# Patient Record
Sex: FEMALE | Race: WHITE | NOT HISPANIC OR LATINO | Employment: UNEMPLOYED | URBAN - METROPOLITAN AREA
[De-identification: names, ages, dates, MRNs, and addresses within clinical notes are randomized per-mention and may not be internally consistent; named-entity substitution may affect disease eponyms.]

---

## 2017-01-29 ENCOUNTER — HOSPITAL ENCOUNTER (OUTPATIENT)
Dept: RADIOLOGY | Facility: MEDICAL CENTER | Age: 22
Discharge: HOME | End: 2017-01-29
Attending: PHYSICIAN ASSISTANT | Admitting: FAMILY MEDICINE
Payer: COMMERCIAL

## 2017-01-29 ENCOUNTER — OFFICE VISIT (OUTPATIENT)
Dept: URGENT CARE | Facility: MEDICAL CENTER | Age: 22
End: 2017-01-29
Payer: COMMERCIAL

## 2017-01-29 DIAGNOSIS — S59.909A INJURY OF ELBOW: ICD-10-CM

## 2017-01-29 PROCEDURE — 99202 OFFICE O/P NEW SF 15 MIN: CPT

## 2017-01-29 PROCEDURE — 73080 X-RAY EXAM OF ELBOW: CPT

## 2018-08-30 ENCOUNTER — OFFICE VISIT (OUTPATIENT)
Dept: URGENT CARE | Facility: MEDICAL CENTER | Age: 23
End: 2018-08-30
Payer: COMMERCIAL

## 2018-08-30 VITALS
DIASTOLIC BLOOD PRESSURE: 84 MMHG | BODY MASS INDEX: 28.93 KG/M2 | TEMPERATURE: 98.2 F | HEIGHT: 66 IN | SYSTOLIC BLOOD PRESSURE: 124 MMHG | HEART RATE: 98 BPM | WEIGHT: 180 LBS | OXYGEN SATURATION: 98 % | RESPIRATION RATE: 18 BRPM

## 2018-08-30 DIAGNOSIS — T22.212A PARTIAL THICKNESS BURN OF LEFT FOREARM, INITIAL ENCOUNTER: Primary | ICD-10-CM

## 2018-08-30 PROCEDURE — 99213 OFFICE O/P EST LOW 20 MIN: CPT | Performed by: FAMILY MEDICINE

## 2018-08-30 RX ORDER — SULFAMETHOXAZOLE AND TRIMETHOPRIM 800; 160 MG/1; MG/1
1 TABLET ORAL EVERY 12 HOURS SCHEDULED
Qty: 20 TABLET | Refills: 0 | Status: SHIPPED | OUTPATIENT
Start: 2018-08-30 | End: 2018-09-09

## 2018-08-30 RX ORDER — DULOXETIN HYDROCHLORIDE 60 MG/1
CAPSULE, DELAYED RELEASE ORAL
COMMUNITY
Start: 2018-04-27

## 2018-08-30 RX ORDER — NORGESTIMATE AND ETHINYL ESTRADIOL 0.25-0.035
1 KIT ORAL
COMMUNITY
Start: 2018-04-30

## 2018-08-30 NOTE — PROGRESS NOTES
[facility] Now        NAME: Sidney Simmonds is a 23 y o  female  : 1995    MRN: [id number]  DATE: 2018  TIME: 6:13 PM    Assessment and Plan   Partial thickness burn of left forearm, initial encounter [T22 212A]  1  Partial thickness burn of left forearm, initial encounter  sulfamethoxazole-trimethoprim (BACTRIM DS) 800-160 mg per tablet         Patient Instructions       Follow up with PCP in 3-5 days  Proceed to  ER if symptoms worsen  Chief Complaint     Chief Complaint   Patient presents with    Burn     ~ [id number] to left forearm on Saturday with  lightbulb; today periphery became swollen and sore         History of Present Illness       23year-old here today because she sustained a burn on her left forearm 5 days ago  Patient mistakenly rubbed her left forearm against a light pole  She had pain immediately  She applied ice  However it is blistered and she has been apply Silvadene cream   However since yesterday, she has noticed redness and swelling of her left forearm and pain  Describes some drainage  Denies any fever or chills  Review of Systems   Review of Systems   Constitutional: Negative  Skin: Positive for wound           Current Medications       Current Outpatient Prescriptions:     DULoxetine (CYMBALTA) 60 mg delayed release capsule, TAKE ONE CAPSULE BY MOUTH EVERY DAY, Disp: , Rfl:     norgestimate-ethinyl estradiol (ORTHO-CYCLEN) 0 25-35 MG-MCG per tablet, Take 1 tablet by mouth, Disp: , Rfl:     sulfamethoxazole-trimethoprim (BACTRIM DS) 800-160 mg per tablet, Take 1 tablet by mouth every 12 (twelve) hours for 10 days, Disp: 20 tablet, Rfl: 0    Current Allergies     Allergies as of 2018    (No Known Allergies)            The following portions of the patient's history were reviewed and updated as appropriate: allergies, current medications, past family history, past medical history, past social history, past surgical history and problem list  No past medical history on file  No past surgical history on file  No family history on file  Medications have been verified  Objective   /84   Pulse 98   Temp 98 2 °F (36 8 °C)   Resp 18   Ht 5' 6" (1 676 m)   Wt 81 6 kg (180 lb)   SpO2 98%   BMI 29 05 kg/m²        Physical Exam     Physical Exam   Skin:   Left forearm reveals a wound measuring approximately 3 x 2 cm in circumference  Findings consistent with second-degree burn  There is also an area of erythema around the wound measuring 6-8 cm  Findings consistent with cellulitis  Nursing note and vitals reviewed

## 2018-08-30 NOTE — PATIENT INSTRUCTIONS
I apply Silvadene cream to the burn followed by nonadherent dressing  Patient started empirically on Bactrim DS 1 tablet twice a day for 10 days  Advised patient to keep left arm elevated, take Tylenol ibuprofen as needed for pain  Follow-up per primary care provider if wound seems infected  Acute Wound Care   AMBULATORY CARE:   An acute wound  is an injury that causes a break in the skin  An acute wound can happen suddenly, last a short time, and may heal on its own  Common signs and symptoms of an acute wound:   · A cut, tear, or gash in your skin    · Bleeding, swelling, pain, or trouble moving the affected area    · Dirt or foreign objects inside the wound     · Milky, yellow, green, or brown pus in the wound     · Red, tender, or warm area around the pus    · Fever  Seek care immediately if:   · You have pus or a foul odor coming from the wound  · You have sudden trouble breathing or chest pain  · Blood soaks through your bandage  Contact your healthcare provider if:   · You have muscle, joint, or body aches, sweating, or a fever  · You have more swelling, redness, or bleeding in your wound  · Your skin is itchy, swollen, or you have a rash  · You have questions or concerns about your condition or care  Treatment for an acute wound  may include any of the following:  · Cleansing  is done with soap and water to wash away germs and decrease the risk of infection  Sterile water further cleans the wound  The cleaning is done under high pressure with a catheter tip and large syringe  A solution that kills germs may also be used  · Debridement  is done to clean and remove objects, dirt, or dead tissues from the open wound  Healthcare providers may also drain the wound to clean out pus  · Closure of the wound  is done with stitches, staples, skin adhesive, or other treatments  This may be done if the wound is wide or deep   Stitches may be needed if the wound is in an area that moves a lot, such as the hands, feet, and joints  Stitches may help to keep the wound from getting infected  They may also decrease the amount of scarring you have  Some wounds may heal better without stitches  Wound care:   · If your wound was closed with thin strips of medical tape, keep them clean and dry  The strips of medical tape will fall off on their own  Do not pull them off  · Keep the bandage clean and dry  Do not remove the bandage over your wound unless your healthcare provider says it is okay  · Wash your hands before and after you take care of your wound to prevent infection  · Clean the wound as directed  If you cannot reach the wound, have someone help you  · If you have packing, make sure all the gauze used to pack the wound is taken out and replaced as directed  Keep track of how many gauze dressings are placed inside the wound  Follow up with your healthcare provider as directed:  Write down your questions so you remember to ask them during your visits  © 2016 [employer] is for End User's use only and may not be sold, redistributed or otherwise used for commercial purposes  All illustrations and images included in CareNotes® are the copyrighted property of A D A M , [employer]  or [employer]  The above information is an  only  It is not intended as medical advice for individual conditions or treatments  Talk to your doctor, nurse or pharmacist before following any medical regimen to see if it is safe and effective for you

## 2020-09-01 ENCOUNTER — APPOINTMENT (OUTPATIENT)
Dept: RADIOLOGY | Facility: MEDICAL CENTER | Age: 25
End: 2020-09-01
Payer: COMMERCIAL

## 2020-09-01 ENCOUNTER — OFFICE VISIT (OUTPATIENT)
Dept: URGENT CARE | Facility: MEDICAL CENTER | Age: 25
End: 2020-09-01
Payer: COMMERCIAL

## 2020-09-01 ENCOUNTER — OFFICE VISIT (OUTPATIENT)
Dept: OBGYN CLINIC | Facility: MEDICAL CENTER | Age: 25
End: 2020-09-01
Payer: COMMERCIAL

## 2020-09-01 VITALS
BODY MASS INDEX: 36.8 KG/M2 | SYSTOLIC BLOOD PRESSURE: 115 MMHG | WEIGHT: 229 LBS | HEIGHT: 66 IN | TEMPERATURE: 98.1 F | HEART RATE: 76 BPM | DIASTOLIC BLOOD PRESSURE: 72 MMHG

## 2020-09-01 VITALS
HEART RATE: 85 BPM | DIASTOLIC BLOOD PRESSURE: 69 MMHG | BODY MASS INDEX: 37.11 KG/M2 | OXYGEN SATURATION: 99 % | TEMPERATURE: 98.9 F | WEIGHT: 229.94 LBS | SYSTOLIC BLOOD PRESSURE: 120 MMHG | RESPIRATION RATE: 20 BRPM

## 2020-09-01 DIAGNOSIS — S92.902A CLOSED FRACTURE OF LEFT FOOT, INITIAL ENCOUNTER: ICD-10-CM

## 2020-09-01 DIAGNOSIS — S92.902A CLOSED FRACTURE OF LEFT FOOT, INITIAL ENCOUNTER: Primary | ICD-10-CM

## 2020-09-01 DIAGNOSIS — S99.912A ANKLE INJURY, LEFT, INITIAL ENCOUNTER: ICD-10-CM

## 2020-09-01 PROCEDURE — 29515 APPLICATION SHORT LEG SPLINT: CPT | Performed by: PHYSICIAN ASSISTANT

## 2020-09-01 PROCEDURE — 73630 X-RAY EXAM OF FOOT: CPT

## 2020-09-01 PROCEDURE — 29515 APPLICATION SHORT LEG SPLINT: CPT | Performed by: ORTHOPAEDIC SURGERY

## 2020-09-01 PROCEDURE — 73610 X-RAY EXAM OF ANKLE: CPT

## 2020-09-01 PROCEDURE — 99203 OFFICE O/P NEW LOW 30 MIN: CPT | Performed by: ORTHOPAEDIC SURGERY

## 2020-09-01 PROCEDURE — 99213 OFFICE O/P EST LOW 20 MIN: CPT | Performed by: PHYSICIAN ASSISTANT

## 2020-09-01 NOTE — PATIENT INSTRUCTIONS
Foot Fracture in Adults   WHAT YOU NEED TO KNOW:   A foot fracture is a break in one or more of the bones in your foot  Foot fractures are commonly caused by trauma, falls, or repeated stress injuries  DISCHARGE INSTRUCTIONS:   Medicines:   · Antibiotics: This medicine is given to help treat or prevent an infection caused by bacteria  · NSAIDs:  These medicines decrease swelling and pain  NSAIDs are available without a doctor's order  Ask which medicine is right for you  Ask how much to take and when to take it  Take as directed  NSAIDs can cause stomach bleeding and kidney problems if not taken correctly  · Pain medicine: You may be given a prescription medicine to decrease pain  Do not wait until the pain is severe before you take this medicine  · Take your medicine as directed  Contact your healthcare provider if you think your medicine is not helping or if you have side effects  Tell him of her if you are allergic to any medicine  Keep a list of the medicines, vitamins, and herbs you take  Include the amounts, and when and why you take them  Bring the list or the pill bottles to follow-up visits  Carry your medicine list with you in case of an emergency  Follow up with your healthcare provider or bone specialist as directed: You may need to return to have your splint or stitches removed  You may also need to return for tests to make sure your foot is healing  Write down your questions so you remember to ask them during your visits  Wound care:  Carefully wash the wound with soap and water  Dry the area and put on new, clean bandages as directed  Change your bandages when they get wet or dirty  Self-care:   · Rest:  You may need to rest your foot and avoid activities that cause pain  For stress fractures, you will need to avoid the activity that caused the fracture until it heals  Ask when you can return to your normal activities such as work and sports      · Ice:  Ice helps decrease swelling and pain  Ice may also help prevent tissue damage  Use an ice pack or put crushed ice in a plastic bag  Cover it with a towel, and place it on your foot for 15 to 20 minutes every hour as directed  · Elevate your foot:  Raise your foot at or above the level of your heart as often as you can  This will help decrease swelling and pain  Prop your foot on pillows or blankets to keep it elevated comfortably  · Physical therapy: Once your foot has healed, a physical therapist can teach you exercises to help improve movement and strength, and to decrease pain  Splint care:   · Check the skin around your splint daily for any redness or open areas  · Do not use a sharp or pointed object to scratch your skin under the splint  · Do not remove your splint unless your healthcare provider or orthopedic surgeon says it is okay  Bathing with a splint:  Do not let your splint get wet  Before bathing, cover the splint with a plastic bag  Tape the bag to your skin above the splint to seal out the water  Keep your foot out of the water in case the bag leaks  Ask when it is okay to take a bath or shower  Assistive devices: You may be given a hard-soled shoe to wear while your foot is healing  You also may need to use crutches to help you walk while your foot heals  It is important to use your crutches correctly  Ask for more information about how to use crutches  Contact your healthcare provider or bone specialist if:   · You have a fever  · You have new sores around your boot or splint  · You have new or worsening trouble moving your foot  · You notice a foul smell coming from under your splint  · Your boot or splint gets damaged  · You have questions or concerns about your condition or care  Return to the emergency department if:   · The pain in your injured foot gets worse even after you rest and take pain medicine      · The skin or toes of your foot become numb, swollen, cold, white, or blue     · You have more pain or swelling than you did before the splint was put on  · Your wound is draining fluid or pus  · Blood soaks through your bandage  · Your leg feels warm, tender, and painful  It may look swollen and red  · You suddenly feel lightheaded and short of breath  · You have chest pain when you take a deep breath or cough  You may cough up blood  © 2017 [employer]  Information is for End User's use only and may not be sold, redistributed or otherwise used for commercial purposes  All illustrations and images included in CareNotes® are the copyrighted property of A D [employer] M , Inc  or [employer]  The above information is an  only  It is not intended as medical advice for individual conditions or treatments  Talk to your doctor, nurse or pharmacist before following any medical regimen to see if it is safe and effective for you

## 2020-09-01 NOTE — PROGRESS NOTES
[facility]        NAME: Carmelita Nguyen is a 27 y o  female  : 1995    MRN: [id number]  DATE: 2020  TIME: 10:15 AM    Assessment and Plan   Closed fracture of left foot, initial encounter [F50 125N]  1  Closed fracture of left foot, initial encounter  Ambulatory referral to Orthopedic Surgery   2  Ankle injury, left, initial encounter  XR ankle 3+ vw left         Patient Instructions     X-ray in office shows probable foot fracture  Provider read  Pending radiology review  Splinted in office  Referral to orthopedics was placed  Advised to follow-up with ortho  RICE  Ibuprofen or tylenol as needed for pain  Follow up with PCP in 3-5 days  Proceed to  ER if symptoms worsen  Chief Complaint     Chief Complaint   Patient presents with    Ankle Pain     Patient states she twisted her L ankle last night in the yard while moving  Pain is in the medial aspect of her ankle on the L         History of Present Illness       Ankle Pain    The incident occurred 12 to 24 hours ago (11:30 last night)  The incident occurred at home  The injury mechanism was an eversion injury (patient states she stepped in 6 inch hole and states it smacked the inner part of her ankle and foot)  The pain is present in the left foot and left ankle  The quality of the pain is described as stabbing  Pain scale: 10/10 with walking, worse with walking down stairs  The pain is moderate  Associated symptoms include an inability to bear weight, numbness and tingling  Pertinent negatives include no loss of motion or loss of sensation  She reports no foreign bodies present  The symptoms are aggravated by weight bearing  She has tried nothing for the symptoms  Review of Systems   Review of Systems   Musculoskeletal: Positive for gait problem (antalgic gait secondary to left foot injury)  Skin: Positive for color change (positive for bruising along medial aspect of left foot)  Negative for pallor, rash and wound  Neurological: Positive for tingling and numbness  Current Medications       Current Outpatient Medications:     norgestimate-ethinyl estradiol (ORTHO-CYCLEN) 0 25-35 MG-MCG per tablet, Take 1 tablet by mouth, Disp: , Rfl:     DULoxetine (CYMBALTA) 60 mg delayed release capsule, TAKE ONE CAPSULE BY MOUTH EVERY DAY, Disp: , Rfl:     Current Allergies     Allergies as of 09/01/2020 - Reviewed 08/30/2018   Allergen Reaction Noted    Pollen extract  04/18/2016            The following portions of the patient's history were reviewed and updated as appropriate: allergies, current medications, past family history, past medical history, past social history, past surgical history and problem list      History reviewed  No pertinent past medical history  History reviewed  No pertinent surgical history  History reviewed  No pertinent family history  Medications have been verified  Objective   /69   Pulse 85   Temp 98 9 °F (37 2 °C)   Resp 20   Wt 104 kg (229 lb 15 oz)   LMP 08/11/2020   SpO2 99%   BMI 37 11 kg/m²          Physical Exam     Physical Exam  Vitals signs and nursing note reviewed  Constitutional:       General: She is not in acute distress  Appearance: Normal appearance  She is not ill-appearing or toxic-appearing  Cardiovascular:      Rate and Rhythm: Normal rate and regular rhythm  Pulses: Normal pulses  Heart sounds: Normal heart sounds  No murmur  No friction rub  No gallop  Pulmonary:      Effort: Pulmonary effort is normal  No respiratory distress  Breath sounds: Normal breath sounds  No stridor  No wheezing or rhonchi  Musculoskeletal:      Left ankle: She exhibits decreased range of motion and swelling  She exhibits no ecchymosis  Tenderness  Medial malleolus tenderness found  No lateral malleolus tenderness found  Achilles tendon exhibits no pain, no defect and normal Mcallister's test results  Left foot: Decreased range of motion  Tenderness and bony tenderness present  No swelling  Feet:    Neurological:      General: No focal deficit present  Mental Status: She is alert and oriented to person, place, and time  Psychiatric:         Mood and Affect: Mood normal          Behavior: Behavior normal        Splint application    Date/Time: 9/1/2020 10:45 AM  Performed by: Khushi Rodriguez PA-C  Authorized by: Khushi Rodriguez PA-C     Consent:     Consent obtained:  Verbal    Consent given by:  Patient    Risks discussed:  Discoloration, numbness, pain and swelling  Pre-procedure details:     Sensation:  Normal  Procedure details:     Laterality:  Left    Location:  Foot    Foot:  L foot    Splint type:  Short leg    Supplies:  Ortho-Glass and cotton padding  Post-procedure details:     Pain:  Improved    Sensation:  Normal    Patient tolerance of procedure:   Tolerated well, no immediate complications

## 2020-09-01 NOTE — PROGRESS NOTES
Orthopaedic Surgery - Office Note  Jose Pinon (89 y o  female)   : 1995   MRN: [id number]  Encounter Date: 2020    Chief Complaint   Patient presents with    Left Foot - Pain       Assessment / Plan  Midfoot injury, concern for navicular fracture vs TMT sprain   DOI: 2020    · CT ordered to better assess the navicular   · New splint was placed today  · NWB LLE  · Declined work note  Return for after CT  History of Present Illness  Joes Pinon is a 27 y o  female who presents for initial evaluation of her left foot injury sustained yesterday 2020  She was seen earlier today in urgent care and was referred to Orthopedics  She states that she is in the process of moving and was unloading her truck last night when she stepped in a hole and inverted her left ankle  She had immediate pain to medial aspect of the foot  She was able to weightbear but with significant pain  She presented to urgent care where x-rays were performed and was told there is concern for fracture her foot  She was splinted and placed on bilateral crutches  She presents today with pain continuing in the medial aspect of the foot  She denies any lateral foot or ankle pain  She denies any previous history of significant injury to this foot  Review of Systems  Pertinent items are noted in HPI  All other systems were reviewed and are negative  Physical Exam  /72   Pulse 76   Temp 98 1 °F (36 7 °C)   Ht 5' 6" (1 676 m)   Wt 104 kg (229 lb)   LMP 2020   BMI 36 96 kg/m²   Cons: Appears well  No apparent distress  Psych: Alert  Oriented x3  Mood and affect normal   Eyes: PERRLA, EOMI  Resp: Normal effort  No audible wheezing or stridor  CV: Palpable pulse  No discernable arrhythmia  No LE edema  Lymph:  No palpable cervical, axillary, or inguinal lymphadenopathy  Skin: Warm  No palpable masses  No visible lesions  Neuro: Normal muscle tone  Normal and symmetric DTR's       Left Foot & Ankle Exam  Alignment:  Normal ankle alignment  Inspection:  No swelling  No ecchymosis  Palpation:  Navicular and instep tenderness  ROM:  Normal ankle ROM  with pain, specifically inversion  Strength:  Not tested  Stability:  No objective ankle instablity  (-) Anterior  neutral and PF  (-) Talar Tilt  Tests:  No pertinent positive or negative tests  Neurovascular:  Sensation intact in DP/SP/Hernández/Sa/T nerve distributions  2+ DP & PT pulses  Brisk capillary refill in all toes  Toes warm and perfused  Gait:  Not tested  Studies Reviewed  I have personally reviewed pertinent films in PACS and my interpretation is X-ray left foot 09/01/2020: There is apparent irregularity in the navicular, concern for fracture  Splint application    Date/Time: 9/1/2020 3:35 PM  Performed by: Jaime Garcia MD  Authorized by: Jaime Garcia MD     Consent:     Consent obtained:  Verbal    Consent given by:  Patient  Pre-procedure details:     Sensation:  Normal  Procedure details:     Laterality:  Left    Location:  Foot    Foot:  L foot    Splint type:  Short leg    Supplies:  Cotton padding, fiberglass and elastic bandage  Post-procedure details:     Pain:  Unchanged    Sensation:  Normal    Patient tolerance of procedure: Tolerated well, no immediate complications           Medical, Surgical, Family, and Social History  The patient's medical history, family history, and social history, were reviewed and updated as appropriate  History reviewed  No pertinent past medical history  History reviewed  No pertinent surgical history  History reviewed  No pertinent family history      Social History     Occupational History    Not on file   Tobacco Use    Smoking status: Never Smoker    Smokeless tobacco: Never Used   Substance and Sexual Activity    Alcohol use: Not Currently    Drug use: Never    Sexual activity: Not on file       Allergies   Allergen Reactions    Pollen Extract Current Outpatient Medications:     DULoxetine (CYMBALTA) 60 mg delayed release capsule, TAKE ONE CAPSULE BY MOUTH EVERY DAY, Disp: , Rfl:     norgestimate-ethinyl estradiol (ORTHO-CYCLEN) 0 25-35 MG-MCG per tablet, Take 1 tablet by mouth, Disp: , Rfl:       Edwina Tracey MA    Scribe Attestation    I,:   Edwina Tracey MA am acting as a scribe while in the presence of the attending physician :        I,:   Christine Khanna MD personally performed the services described in this documentation    as scribed in my presence :

## 2020-09-02 ENCOUNTER — TELEPHONE (OUTPATIENT)
Dept: OBGYN CLINIC | Facility: HOSPITAL | Age: 25
End: 2020-09-02

## 2020-09-02 NOTE — TELEPHONE ENCOUNTER
Patient has moved up CT scan to 9/4 and would like call to confirm new date has been authorized before going   [phone]

## 2020-09-03 ENCOUNTER — TELEPHONE (OUTPATIENT)
Dept: OBGYN CLINIC | Facility: HOSPITAL | Age: 25
End: 2020-09-03

## 2020-09-03 NOTE — TELEPHONE ENCOUNTER
Patient sees Dr Rosendo Perrin    Patient called in stating that her CT was cancelled and wanted to know what can she do in the meantime until the CT is rescheduled? She wanted to know if she can have a boot & if its safe for her to bear weight   Thank you        [phone]

## 2020-09-03 NOTE — TELEPHONE ENCOUNTER
Left voice mail for the patient, informing her that her CT prior authorization is still pending with [place]  I also told her to expect a call from pre-encounter either later today or tomorrow morning to let her know if it is authorized or not  Also left a callback number for patient

## 2020-09-04 DIAGNOSIS — S92.902A CLOSED FRACTURE OF LEFT FOOT, INITIAL ENCOUNTER: Primary | ICD-10-CM

## 2020-09-04 NOTE — TELEPHONE ENCOUNTER
Spoke with the patient  I explained that her insurance would only authorize an MRI and Dr Ingris Vasquez is okay with the MRI  She will call central scheduling to schedule the MRI and then call back to reschedule her follow up  Patient was provided with the phones numbers and verbalized understanding

## 2020-09-04 NOTE — TELEPHONE ENCOUNTER
Noted  On previous call I did confirm that the patient should continue with this splint and nonweightbearing

## 2020-09-04 NOTE — TELEPHONE ENCOUNTER
Please help the patient reschedule this CT scan or get the MRI as the insurance may be requesting  Notify me if he needs something else put in

## 2020-09-11 ENCOUNTER — HOSPITAL ENCOUNTER (OUTPATIENT)
Dept: RADIOLOGY | Facility: HOSPITAL | Age: 25
Discharge: HOME | End: 2020-09-11
Payer: COMMERCIAL

## 2020-09-11 DIAGNOSIS — S92.902A CLOSED FRACTURE OF LEFT FOOT, INITIAL ENCOUNTER: ICD-10-CM

## 2020-09-11 PROCEDURE — 73718 MRI LOWER EXTREMITY W/O DYE: CPT

## 2020-09-11 PROCEDURE — G1004 CDSM NDSC: HCPCS

## 2020-09-15 ENCOUNTER — OFFICE VISIT (OUTPATIENT)
Dept: OBGYN CLINIC | Facility: MEDICAL CENTER | Age: 25
End: 2020-09-15
Payer: COMMERCIAL

## 2020-09-15 VITALS
BODY MASS INDEX: 36.8 KG/M2 | SYSTOLIC BLOOD PRESSURE: 138 MMHG | TEMPERATURE: 99.6 F | WEIGHT: 229 LBS | HEART RATE: 97 BPM | DIASTOLIC BLOOD PRESSURE: 80 MMHG | HEIGHT: 66 IN

## 2020-09-15 DIAGNOSIS — M79.672 PAIN IN LEFT FOOT: Primary | ICD-10-CM

## 2020-09-15 PROCEDURE — 99213 OFFICE O/P EST LOW 20 MIN: CPT | Performed by: ORTHOPAEDIC SURGERY

## 2020-09-15 NOTE — PROGRESS NOTES
Orthopaedic Surgery - Office Note  Romero Ma (23 y o  female)   : 1995   MRN: [id number]  Encounter Date: 9/15/2020    Chief Complaint   Patient presents with    Left Ankle - Follow-up       Assessment / Plan  Left foot pain, accessory navicular, no acute fracture    · Patient was transitioned into a short CAM boot today   · She may WBAT and use crutches as needed  · Ice and anti-inflammatories as needed for pain  Return for 6 weeks for [place]  History of Present Illness  Romero Ma is a 23 y o  female who presents for follow up  She states that she has been compliant with her splint care instructions  She has been NWB but this has been difficult being that she has a 2 month old  She continues to localize her pain medially around the instep  She had increased pain during and after the MRI  Review of Systems  Pertinent items are noted in HPI  All other systems were reviewed and are negative  Physical Exam  /80   Pulse 97   Temp 99 6 °F (37 6 °C)   Ht 5' 6" (1 676 m)   Wt 104 kg (229 lb)   BMI 36 96 kg/m²   Cons: Appears well  No apparent distress  Psych: Alert  Oriented x3  Mood and affect normal   Eyes: PERRLA, EOMI  Resp: Normal effort  No audible wheezing or stridor  CV: Palpable pulse  No discernable arrhythmia  No LE edema  Lymph:  No palpable cervical, axillary, or inguinal lymphadenopathy  Skin: Warm  No palpable masses  No visible lesions  Neuro: Normal muscle tone  Normal and symmetric DTR's  Left Foot & Ankle Exam  Alignment:  Normal ankle alignment  Inspection:  No swelling  No ecchymosis  Palpation:  Navicular tenderness  ROM:  Normal ankle ROM  Strength:  5/5 AT, GSC, PT, and peroneals  With pain  Stability:  (-) Anterior  neutral  (-) Talar Tilt  Tests:  (-) Syndesmosis squeeze test   Neurovascular:  Sensation intact in DP/SP/Hernández/Sa/T nerve distributions  2+ DP & PT pulses  Brisk capillary refill in all toes   Toes warm and perfused  Gait:  Not tested  Studies Reviewed  I have personally reviewed pertinent films in PACS and my interpretation is MRI left foot 9/11/2020: normal study  Procedures  No procedures today  Medical, Surgical, Family, and Social History  The patient's medical history, family history, and social history, were reviewed and updated as appropriate  History reviewed  No pertinent past medical history  History reviewed  No pertinent surgical history  History reviewed  No pertinent family history      Social History     Occupational History    Not on file   Tobacco Use    Smoking status: Never Smoker    Smokeless tobacco: Never Used   Substance and Sexual Activity    Alcohol use: Not Currently    Drug use: Never    Sexual activity: Not on file       Allergies   Allergen Reactions    Pollen Extract          Current Outpatient Medications:     DULoxetine (CYMBALTA) 60 mg delayed release capsule, TAKE ONE CAPSULE BY MOUTH EVERY DAY, Disp: , Rfl:     norgestimate-ethinyl estradiol (ORTHO-CYCLEN) 0 25-35 MG-MCG per tablet, Take 1 tablet by mouth, Disp: , Rfl:       Edil Mcintyre MA    Scribe Attestation    I,:   Edil Mcintyre MA am acting as a scribe while in the presence of the attending physician :        I,:   Kar Spence MD personally performed the services described in this documentation    as scribed in my presence :